# Patient Record
Sex: FEMALE | Race: OTHER | HISPANIC OR LATINO | URBAN - METROPOLITAN AREA
[De-identification: names, ages, dates, MRNs, and addresses within clinical notes are randomized per-mention and may not be internally consistent; named-entity substitution may affect disease eponyms.]

---

## 2024-05-20 ENCOUNTER — OFFICE (OUTPATIENT)
Dept: URBAN - METROPOLITAN AREA CLINIC 94 | Facility: CLINIC | Age: 74
Setting detail: OPHTHALMOLOGY
End: 2024-05-20
Payer: MEDICARE

## 2024-05-20 DIAGNOSIS — H35.3131: ICD-10-CM

## 2024-05-20 PROBLEM — E11.3293 DM TYPE 2; BOTH MILD WITHOUT ME: Status: ACTIVE | Noted: 2024-05-20

## 2024-05-20 PROBLEM — H43.392 VITREOUS FLOATERS/OPACITIES; RIGHT EYE, LEFT EYE: Status: ACTIVE | Noted: 2024-05-20

## 2024-05-20 PROBLEM — H43.391 VITREOUS FLOATERS/OPACITIES; RIGHT EYE, LEFT EYE: Status: ACTIVE | Noted: 2024-05-20

## 2024-05-20 PROCEDURE — 92004 COMPRE OPH EXAM NEW PT 1/>: CPT | Performed by: SPECIALIST

## 2024-05-20 PROCEDURE — 92235 FLUORESCEIN ANGRPH MLTIFRAME: CPT | Performed by: SPECIALIST

## 2024-05-20 PROCEDURE — 92134 CPTRZ OPH DX IMG PST SGM RTA: CPT | Performed by: SPECIALIST

## 2024-05-20 ASSESSMENT — LID EXAM ASSESSMENTS: OS_COMMENTS: 10, PROMINENT FOREHEAD RHYTIDS

## 2024-05-20 ASSESSMENT — CONFRONTATIONAL VISUAL FIELD TEST (CVF)
OS_FINDINGS: FULL
OD_FINDINGS: FULL

## 2024-05-20 ASSESSMENT — LID POSITION - PTOSIS
OD_PTOSIS: RUL
OS_PTOSIS: LUL

## 2024-05-30 ENCOUNTER — APPOINTMENT (OUTPATIENT)
Dept: ORTHOPEDIC SURGERY | Facility: CLINIC | Age: 74
End: 2024-05-30

## 2024-05-30 VITALS
DIASTOLIC BLOOD PRESSURE: 80 MMHG | HEIGHT: 57 IN | SYSTOLIC BLOOD PRESSURE: 150 MMHG | BODY MASS INDEX: 31.93 KG/M2 | HEART RATE: 76 BPM | WEIGHT: 148 LBS

## 2024-05-30 DIAGNOSIS — M79.643 PAIN IN UNSPECIFIED HAND: ICD-10-CM

## 2024-05-30 DIAGNOSIS — M65.30 TRIGGER FINGER, UNSPECIFIED FINGER: ICD-10-CM

## 2024-05-30 DIAGNOSIS — M19.049 PRIMARY OSTEOARTHRITIS, UNSPECIFIED HAND: ICD-10-CM

## 2024-05-30 DIAGNOSIS — M79.646 PAIN IN UNSPECIFIED HAND: ICD-10-CM

## 2024-05-30 DIAGNOSIS — M72.0 PALMAR FASCIAL FIBROMATOSIS [DUPUYTREN]: ICD-10-CM

## 2024-05-30 PROBLEM — Z00.00 ENCOUNTER FOR PREVENTIVE HEALTH EXAMINATION: Status: ACTIVE | Noted: 2024-05-30

## 2024-05-30 NOTE — ASSESSMENT
[FreeTextEntry1] : ASSESSMENT: [The patient was accompanied today and was assisted with explaining their complaints today.] The patient comes in today with multiple chronic exacerbated complaints and conditions which consist of chronic Dupuytren's contracture.  We have discussed treatment modalities for this.  With this in mind she elects to proceed with surgical management however would like to proceed after her trip in July. Furthermore, we have discussed her tendinopathy and small joint arthropathy and stiffness with this in mind she would like to proceed with injections   The patient was adequately and thoroughly informed of my assessment of their current condition(s).  - This may diminish bodily function for the extremity. We discussed prognosis, tx modalities including operative and nonoperative options for the above diagnostic assessment. As always, 2nd opinion is always provided as an option.  When accessible, I was able to review other physicians note(s) including reviewing other tests, imaging results as well as personally view these results for my own interpretation.   Injection:   The risks and benefits of a steroid injection were discussed in detail. The risks include but are not limited to: pain, infection, swelling, flare response, bleeding, subcutaneous fat atrophy, skin depigmentation and/or elevation of blood sugar. The risk of incomplete resolution of symptoms, recurrence and additional intervention was reviewed and considered by the patient. The patient agreed to proceed and under a sterile prep, I injected 1 unit 6mg into 1 cc of a combination of Celestone and Lidocaine into the left index A1, left index PIP joint. The patient tolerated the injection well. The patient was adequately and thoroughly informed of my assessment of their current condition(s).  DISCUSSION: 1.  Injections as above.  Activity modification. 2.  Follow-up in August after her trip to [facility].  Would like to proceed with Dupuytren's release 3. [x]

## 2024-05-30 NOTE — HISTORY OF PRESENT ILLNESS
[FreeTextEntry1] : Ibeth is a pleasant 74-year-old female who presents today with a chronic history of left hand contracture furthermore she complains of swelling and pain and episodes of triggering separately.  It is inhibiting ADLs.

## 2024-05-30 NOTE — PHYSICAL EXAM
[de-identified] : Examination of the left hand reveals a palpable pretendinous cord down to the level of the small MCP with a 50 degree contracture and 10 degree contracture of PIP joint.  There is a 10 degree contracture of the adjacent ring MCP joint.  Positive tabletop test.  Noncorrectable  Examination of the hand(s)  particularly at the A1 of the left index reveals tenderness with a palpable click. Examination of the left index also reveals tenderness and bogginess at the PIP joint.  No signs of infection.  There is mild stiffness [de-identified] : [4] views of [bilateral hands and wrists] were obtained today in my office and were seen by me and discussed with the patient.  These [show findings consistent with bilateral basal joint OA and findings of IP joint OA]

## 2024-06-18 ENCOUNTER — OFFICE (OUTPATIENT)
Dept: URBAN - METROPOLITAN AREA CLINIC 94 | Facility: CLINIC | Age: 74
Setting detail: OPHTHALMOLOGY
End: 2024-06-18
Payer: MEDICARE

## 2024-06-18 DIAGNOSIS — H02.423: ICD-10-CM

## 2024-06-18 DIAGNOSIS — G24.5: ICD-10-CM

## 2024-06-18 DIAGNOSIS — H04.121: ICD-10-CM

## 2024-06-18 DIAGNOSIS — H04.122: ICD-10-CM

## 2024-06-18 DIAGNOSIS — H04.123: ICD-10-CM

## 2024-06-18 PROBLEM — H43.392 VITREOUS FLOATERS/OPACITIES; RIGHT EYE, LEFT EYE: Status: ACTIVE | Noted: 2024-06-18

## 2024-06-18 PROBLEM — H43.391 VITREOUS FLOATERS/OPACITIES; RIGHT EYE, LEFT EYE: Status: ACTIVE | Noted: 2024-06-18

## 2024-06-18 PROCEDURE — 83861 MICROFLUID ANALY TEARS: CPT | Mod: QW,RT | Performed by: OPHTHALMOLOGY

## 2024-06-18 PROCEDURE — 92012 INTRM OPH EXAM EST PATIENT: CPT | Performed by: OPHTHALMOLOGY

## 2024-06-18 PROCEDURE — 83861 MICROFLUID ANALY TEARS: CPT | Mod: QW,LT | Performed by: OPHTHALMOLOGY

## 2024-06-18 ASSESSMENT — LID EXAM ASSESSMENTS: OS_COMMENTS: 10, PROMINENT FOREHEAD RHYTIDS

## 2024-06-18 ASSESSMENT — LID POSITION - PTOSIS
OD_PTOSIS: RUL
OS_PTOSIS: LUL

## 2024-06-18 ASSESSMENT — CONFRONTATIONAL VISUAL FIELD TEST (CVF)
OD_FINDINGS: FULL
OS_FINDINGS: FULL

## 2024-09-12 ENCOUNTER — APPOINTMENT (OUTPATIENT)
Dept: ORTHOPEDIC SURGERY | Facility: CLINIC | Age: 74
End: 2024-09-12
Payer: MEDICARE

## 2024-09-12 VITALS
HEART RATE: 96 BPM | WEIGHT: 148 LBS | DIASTOLIC BLOOD PRESSURE: 79 MMHG | BODY MASS INDEX: 31.93 KG/M2 | SYSTOLIC BLOOD PRESSURE: 125 MMHG | HEIGHT: 57 IN

## 2024-09-12 DIAGNOSIS — M72.0 PALMAR FASCIAL FIBROMATOSIS [DUPUYTREN]: ICD-10-CM

## 2024-09-12 DIAGNOSIS — M19.049 PRIMARY OSTEOARTHRITIS, UNSPECIFIED HAND: ICD-10-CM

## 2024-09-12 DIAGNOSIS — M65.30 TRIGGER FINGER, UNSPECIFIED FINGER: ICD-10-CM

## 2024-09-12 NOTE — ASSESSMENT
[FreeTextEntry1] : ASSESSMENT: [The patient was accompanied today and was assisted with explaining their complaints today.] The patient comes in today with multiple chronic exacerbated complaints and conditions which consist of chronic Dupuytren's contracture.  We have discussed treatment modalities for this.  At this point in time she would like to proceed with surgical management for the condition.  We have discussed risk of recurrence. Furthermore, we have discussed her tendinopathy and small joint arthropathy and stiffness with this in mind she would like to proceed with injections  Dupuytren's contracture release surgery   At this point the patient comes in with significant contracture of their hand and fingers.  We discussed complete fasciectomy as well as partial fasciectomy.  The patient's goal is to achieve as much extension as possible.  We discussed joint stiffness and the likelihood that the patient will need joint releases at the time of surgery with subsequent need for physical therapy and or joint pinning in the acute setting.  We discussed complications including numbness, vascular injury, vascular stretching due to chronic scarring.  We discussed possibility of need for amputation if the finger does not pink up.   We discussed the possibility of this condition returning and/or affecting other digits as a flareup.  We discussed the need for therapy to optimize his outcome.  This is just as important as the surgery itself.  They verbalized complete understanding and willingness to proceed.   Surgical Consent Discussion:   I explained the risks and benefits of surgical intervention to the patient. The risks include, but are not limited to: pain, infection, swelling, stiffness, injury to underlying neurovascular structures, scar sensitivity, incomplete resolution of symptoms, the possibility of the need for future surgery, CRPS, and finally the need to comply with a post-operative occupational therapy protocol. The patient understands, agrees and informed consent was obtained. The patients surgery will be scheduled in the near future.   The patient was adequately and thoroughly informed of my assessment of their current condition(s).  - This may diminish bodily function for the extremity. We discussed prognosis, tx modalities including operative and nonoperative options for the above diagnostic assessment. As always, 2nd opinion is always provided as an option.  When accessible, I was able to review other physicians note(s) including reviewing other tests, imaging results as well as personally view these results for my own interpretation.   The patient was adequately and thoroughly informed of my assessment of their current condition(s).  DISCUSSION: 1.  She elects to proceed with left small finger palmar fasciectomy with release of interphalangeal joint and left index trigger release

## 2024-09-12 NOTE — PHYSICAL EXAM
[de-identified] : Examination of the left hand reveals a palpable pretendinous cord down to the level of the small MCP with a 50 degree contracture and 10 degree contracture of PIP joint.  There is a 10 degree contracture of the adjacent ring MCP joint.  Positive tabletop test.  Noncorrectable  Examination of the hand(s)  particularly at the A1 of the left index reveals tenderness with a palpable click. Examination of the left index also reveals tenderness and bogginess at the PIP joint.  No signs of infection.  There is mild stiffness [de-identified] : [4] views of [bilateral hands and wrists] were reviewed today in my office and were seen by me and discussed with the patient.  These [show findings consistent with bilateral basal joint OA and findings of IP joint OA]

## 2024-09-12 NOTE — HISTORY OF PRESENT ILLNESS
[FreeTextEntry1] : Ibeth is a pleasant 74-year-old female who presents today with a chronic history of left hand contracture as well as swelling and pain and episodes of triggering separately.  It is inhibiting ADLs.

## 2024-09-20 RX ORDER — MELOXICAM 15 MG/1
15 TABLET ORAL DAILY
Qty: 14 | Refills: 0 | Status: ACTIVE | COMMUNITY
Start: 2024-09-20 | End: 1900-01-01

## 2024-09-23 ENCOUNTER — APPOINTMENT (OUTPATIENT)
Dept: ORTHOPEDIC SURGERY | Facility: AMBULATORY SURGERY CENTER | Age: 74
End: 2024-09-23

## 2024-10-02 ENCOUNTER — APPOINTMENT (OUTPATIENT)
Dept: ORTHOPEDIC SURGERY | Facility: CLINIC | Age: 74
End: 2024-10-02
Payer: MEDICARE

## 2024-10-02 DIAGNOSIS — M65.30 TRIGGER FINGER, UNSPECIFIED FINGER: ICD-10-CM

## 2024-10-02 DIAGNOSIS — M72.0 PALMAR FASCIAL FIBROMATOSIS [DUPUYTREN]: ICD-10-CM

## 2024-10-02 DIAGNOSIS — M79.646 PAIN IN UNSPECIFIED HAND: ICD-10-CM

## 2024-10-02 DIAGNOSIS — M79.643 PAIN IN UNSPECIFIED HAND: ICD-10-CM

## 2024-10-02 NOTE — HISTORY OF PRESENT ILLNESS
[de-identified] : S/P: Left hand palmar fasciectomy with release of small finger including interphalangeal joint. Left index trigger finger release. DOS: 9/23/24 [de-identified] : Returns for follow-up. [de-identified] : Incisions are healing beautifully.  There are no signs of infection. Left small finger passively correctable to neutral. There is some stiffness when attempting to make a fist as expected at this stage in the postoperative period. [de-identified] : We are both delighted with the results. She will commence OT to further optimize her outcome.  [de-identified] : 1. Commence OT. Follow up in 6 weeks.

## 2024-11-12 ENCOUNTER — APPOINTMENT (OUTPATIENT)
Dept: ORTHOPEDIC SURGERY | Facility: CLINIC | Age: 74
End: 2024-11-12
Payer: MEDICARE

## 2024-11-12 DIAGNOSIS — M65.30 TRIGGER FINGER, UNSPECIFIED FINGER: ICD-10-CM

## 2024-11-12 DIAGNOSIS — M79.643 PAIN IN UNSPECIFIED HAND: ICD-10-CM

## 2024-11-12 DIAGNOSIS — G56.00 CARPAL TUNNEL SYNDROME, UNSPECIFIED UPPER LIMB: ICD-10-CM

## 2024-11-12 DIAGNOSIS — M79.646 PAIN IN UNSPECIFIED HAND: ICD-10-CM

## 2024-11-12 DIAGNOSIS — M19.049 PRIMARY OSTEOARTHRITIS, UNSPECIFIED HAND: ICD-10-CM

## 2024-12-25 PROBLEM — F10.90 ALCOHOL USE: Status: INACTIVE | Noted: 2019-07-19

## 2025-03-25 ENCOUNTER — APPOINTMENT (OUTPATIENT)
Dept: ORTHOPEDIC SURGERY | Facility: CLINIC | Age: 75
End: 2025-03-25
Payer: MEDICARE

## 2025-03-25 DIAGNOSIS — M65.322 TRIGGER FINGER, LEFT INDEX FINGER: ICD-10-CM

## 2025-03-25 DIAGNOSIS — G56.02 CARPAL TUNNEL SYNDROME, LEFT UPPER LIMB: ICD-10-CM

## 2025-03-25 DIAGNOSIS — M65.342 TRIGGER FINGER, LEFT RING FINGER: ICD-10-CM

## 2025-04-25 ENCOUNTER — APPOINTMENT (OUTPATIENT)
Dept: CARDIOLOGY | Facility: CLINIC | Age: 75
End: 2025-04-25
Payer: MEDICARE

## 2025-04-25 ENCOUNTER — NON-APPOINTMENT (OUTPATIENT)
Age: 75
End: 2025-04-25

## 2025-04-25 VITALS
RESPIRATION RATE: 16 BRPM | HEART RATE: 95 BPM | DIASTOLIC BLOOD PRESSURE: 80 MMHG | HEIGHT: 60 IN | WEIGHT: 153 LBS | SYSTOLIC BLOOD PRESSURE: 139 MMHG | BODY MASS INDEX: 30.04 KG/M2

## 2025-04-25 DIAGNOSIS — R07.89 OTHER CHEST PAIN: ICD-10-CM

## 2025-04-25 DIAGNOSIS — Z13.6 ENCOUNTER FOR SCREENING FOR CARDIOVASCULAR DISORDERS: ICD-10-CM

## 2025-04-25 DIAGNOSIS — E66.9 OBESITY, UNSPECIFIED: ICD-10-CM

## 2025-04-25 DIAGNOSIS — E88.810 METABOLIC SYNDROME: ICD-10-CM

## 2025-04-28 ENCOUNTER — APPOINTMENT (OUTPATIENT)
Dept: ORTHOPEDIC SURGERY | Facility: CLINIC | Age: 75
End: 2025-04-28
Payer: MEDICARE

## 2025-04-28 DIAGNOSIS — M25.642 STIFFNESS OF LEFT HAND, NOT ELSEWHERE CLASSIFIED: ICD-10-CM

## 2025-04-28 DIAGNOSIS — M65.342 TRIGGER FINGER, LEFT RING FINGER: ICD-10-CM

## 2025-04-28 DIAGNOSIS — G56.02 CARPAL TUNNEL SYNDROME, LEFT UPPER LIMB: ICD-10-CM

## 2025-04-28 DIAGNOSIS — M65.322 TRIGGER FINGER, LEFT INDEX FINGER: ICD-10-CM

## 2025-04-28 PROCEDURE — 20550 NJX 1 TENDON SHEATH/LIGAMENT: CPT | Mod: 59,LT

## 2025-04-28 PROCEDURE — 99204 OFFICE O/P NEW MOD 45 MIN: CPT | Mod: 25

## 2025-04-28 PROCEDURE — 20526 THER INJECTION CARP TUNNEL: CPT | Mod: LT

## 2025-05-01 ENCOUNTER — EMERGENCY (EMERGENCY)
Facility: HOSPITAL | Age: 75
LOS: 1 days | End: 2025-05-01
Attending: EMERGENCY MEDICINE
Payer: MEDICARE

## 2025-05-01 VITALS
OXYGEN SATURATION: 99 % | HEART RATE: 109 BPM | WEIGHT: 152.12 LBS | SYSTOLIC BLOOD PRESSURE: 152 MMHG | RESPIRATION RATE: 18 BRPM | TEMPERATURE: 98 F | DIASTOLIC BLOOD PRESSURE: 82 MMHG

## 2025-05-01 VITALS
OXYGEN SATURATION: 100 % | HEART RATE: 66 BPM | RESPIRATION RATE: 20 BRPM | TEMPERATURE: 98 F | DIASTOLIC BLOOD PRESSURE: 87 MMHG | SYSTOLIC BLOOD PRESSURE: 171 MMHG

## 2025-05-01 DIAGNOSIS — Z90.49 ACQUIRED ABSENCE OF OTHER SPECIFIED PARTS OF DIGESTIVE TRACT: Chronic | ICD-10-CM

## 2025-05-01 LAB
ALBUMIN SERPL ELPH-MCNC: 4 G/DL — SIGNIFICANT CHANGE UP (ref 3.3–5.2)
ALP SERPL-CCNC: 90 U/L — SIGNIFICANT CHANGE UP (ref 40–120)
ALT FLD-CCNC: 24 U/L — SIGNIFICANT CHANGE UP
ANION GAP SERPL CALC-SCNC: 17 MMOL/L — SIGNIFICANT CHANGE UP (ref 5–17)
APTT BLD: 28.6 SEC — SIGNIFICANT CHANGE UP (ref 26.1–36.8)
AST SERPL-CCNC: 35 U/L — HIGH
BASOPHILS # BLD AUTO: 0.05 K/UL — SIGNIFICANT CHANGE UP (ref 0–0.2)
BASOPHILS NFR BLD AUTO: 0.5 % — SIGNIFICANT CHANGE UP (ref 0–2)
BILIRUB SERPL-MCNC: 0.4 MG/DL — SIGNIFICANT CHANGE UP (ref 0.4–2)
BUN SERPL-MCNC: 30 MG/DL — HIGH (ref 8–20)
CALCIUM SERPL-MCNC: 9 MG/DL — SIGNIFICANT CHANGE UP (ref 8.4–10.5)
CHLORIDE SERPL-SCNC: 97 MMOL/L — SIGNIFICANT CHANGE UP (ref 96–108)
CO2 SERPL-SCNC: 21 MMOL/L — LOW (ref 22–29)
CREAT SERPL-MCNC: 1.19 MG/DL — SIGNIFICANT CHANGE UP (ref 0.5–1.3)
D DIMER BLD IA.RAPID-MCNC: <150 NG/ML DDU — SIGNIFICANT CHANGE UP
EGFR: 48 ML/MIN/1.73M2 — LOW
EGFR: 48 ML/MIN/1.73M2 — LOW
EOSINOPHIL # BLD AUTO: 0.05 K/UL — SIGNIFICANT CHANGE UP (ref 0–0.5)
EOSINOPHIL NFR BLD AUTO: 0.5 % — SIGNIFICANT CHANGE UP (ref 0–6)
GLUCOSE SERPL-MCNC: 122 MG/DL — HIGH (ref 70–99)
HCT VFR BLD CALC: 40.8 % — SIGNIFICANT CHANGE UP (ref 34.5–45)
HGB BLD-MCNC: 13.6 G/DL — SIGNIFICANT CHANGE UP (ref 11.5–15.5)
IMM GRANULOCYTES # BLD AUTO: 0.06 K/UL — SIGNIFICANT CHANGE UP (ref 0–0.07)
IMM GRANULOCYTES NFR BLD AUTO: 0.6 % — SIGNIFICANT CHANGE UP (ref 0–0.9)
INR BLD: 0.85 RATIO — SIGNIFICANT CHANGE UP (ref 0.85–1.16)
LIDOCAIN IGE QN: 70 U/L — HIGH (ref 22–51)
LYMPHOCYTES # BLD AUTO: 4.03 K/UL — HIGH (ref 1–3.3)
LYMPHOCYTES NFR BLD AUTO: 37.2 % — SIGNIFICANT CHANGE UP (ref 13–44)
MAGNESIUM SERPL-MCNC: 2.1 MG/DL — SIGNIFICANT CHANGE UP (ref 1.6–2.6)
MCHC RBC-ENTMCNC: 29.9 PG — SIGNIFICANT CHANGE UP (ref 27–34)
MCHC RBC-ENTMCNC: 33.3 G/DL — SIGNIFICANT CHANGE UP (ref 32–36)
MCV RBC AUTO: 89.7 FL — SIGNIFICANT CHANGE UP (ref 80–100)
MONOCYTES # BLD AUTO: 0.9 K/UL — SIGNIFICANT CHANGE UP (ref 0–0.9)
MONOCYTES NFR BLD AUTO: 8.3 % — SIGNIFICANT CHANGE UP (ref 2–14)
NEUTROPHILS # BLD AUTO: 5.74 K/UL — SIGNIFICANT CHANGE UP (ref 1.8–7.4)
NEUTROPHILS NFR BLD AUTO: 52.9 % — SIGNIFICANT CHANGE UP (ref 43–77)
NRBC # BLD AUTO: 0 K/UL — SIGNIFICANT CHANGE UP (ref 0–0)
NRBC # FLD: 0 K/UL — SIGNIFICANT CHANGE UP (ref 0–0)
NRBC BLD AUTO-RTO: 0 /100 WBCS — SIGNIFICANT CHANGE UP (ref 0–0)
NT-PROBNP SERPL-SCNC: 96 PG/ML — SIGNIFICANT CHANGE UP (ref 0–300)
PLATELET # BLD AUTO: 338 K/UL — SIGNIFICANT CHANGE UP (ref 150–400)
PMV BLD: 10.7 FL — SIGNIFICANT CHANGE UP (ref 7–13)
POTASSIUM SERPL-MCNC: 4.3 MMOL/L — SIGNIFICANT CHANGE UP (ref 3.5–5.3)
POTASSIUM SERPL-SCNC: 4.3 MMOL/L — SIGNIFICANT CHANGE UP (ref 3.5–5.3)
PROT SERPL-MCNC: 7.6 G/DL — SIGNIFICANT CHANGE UP (ref 6.6–8.7)
PROTHROM AB SERPL-ACNC: 9.9 SEC — SIGNIFICANT CHANGE UP (ref 9.9–13.4)
RAPID RVP RESULT: SIGNIFICANT CHANGE UP
RBC # BLD: 4.55 M/UL — SIGNIFICANT CHANGE UP (ref 3.8–5.2)
RBC # FLD: 13.3 % — SIGNIFICANT CHANGE UP (ref 10.3–14.5)
SARS-COV-2 RNA SPEC QL NAA+PROBE: SIGNIFICANT CHANGE UP
SODIUM SERPL-SCNC: 135 MMOL/L — SIGNIFICANT CHANGE UP (ref 135–145)
TROPONIN T, HIGH SENSITIVITY RESULT: 8 NG/L — SIGNIFICANT CHANGE UP (ref 0–51)
WBC # BLD: 10.83 K/UL — HIGH (ref 3.8–10.5)
WBC # FLD AUTO: 10.83 K/UL — HIGH (ref 3.8–10.5)

## 2025-05-01 PROCEDURE — 99285 EMERGENCY DEPT VISIT HI MDM: CPT

## 2025-05-01 PROCEDURE — 93010 ELECTROCARDIOGRAM REPORT: CPT

## 2025-05-01 PROCEDURE — 71046 X-RAY EXAM CHEST 2 VIEWS: CPT | Mod: 26

## 2025-05-01 RX ORDER — DEXAMETHASONE 0.5 MG/1
10 TABLET ORAL ONCE
Refills: 0 | Status: COMPLETED | OUTPATIENT
Start: 2025-05-01 | End: 2025-05-01

## 2025-05-01 RX ORDER — ALBUTEROL SULFATE 2.5 MG/3ML
2.5 VIAL, NEBULIZER (ML) INHALATION ONCE
Refills: 0 | Status: COMPLETED | OUTPATIENT
Start: 2025-05-01 | End: 2025-05-01

## 2025-05-01 RX ORDER — SODIUM CHLORIDE 9 G/1000ML
500 INJECTION, SOLUTION INTRAVENOUS
Refills: 0 | Status: ACTIVE | OUTPATIENT
Start: 2025-05-01 | End: 2025-05-01

## 2025-05-01 RX ORDER — METOCLOPRAMIDE HCL 10 MG
10 TABLET ORAL ONCE
Refills: 0 | Status: ACTIVE | OUTPATIENT
Start: 2025-05-01 | End: 2025-05-01

## 2025-05-01 RX ADMIN — SODIUM CHLORIDE 500 MILLILITER(S): 9 INJECTION, SOLUTION INTRAVENOUS at 20:44

## 2025-05-01 RX ADMIN — DEXAMETHASONE 102 MILLIGRAM(S): 0.5 TABLET ORAL at 20:39

## 2025-05-01 RX ADMIN — Medication 2.5 MILLIGRAM(S): at 20:39

## 2025-05-01 NOTE — ED PROVIDER NOTE - OBJECTIVE STATEMENT
A 73 yo F with HTN, DM2, HLD, presents to the ED for 2 weeks of cough, associated continuous L upper chest pain radiating to throat, which worsened last night. Using Insuline. Ozempic for 3 years. Denies fevers, chills, headache, SOB, nausea, vomiting, diarrhea, dysuria, dark stools, focal neurologic symptoms. Denies recent immobilization or surgery. Denies hx of CAD.

## 2025-05-01 NOTE — ED PROVIDER NOTE - NSFOLLOWUPINSTRUCTIONS_ED_ALL_ED_FT
1) Follow up with your doctor in 1-2 days  2) Return to the ER for worsening or concerning symptoms    1) Acuda a jarrell jayme de seguimiento con koehler médico en 1 o 2 días.  2) Regrese a urgencias si presenta síntomas preocupantes o empeoramiento.        Paciente: JESSICA KUMAR  Profesional que asiste al paciente: Estinfa, [employer]  Enfermedad de reflujo gastroesofágico en los adultos  Gastroesophageal Reflux Disease, Adult       El reflujo gastroesofágico (RGE) ocurre cuando el ácido del estómago sube por el tubo que conecta la boca con el estómago (esófago). Normalmente, la comida baja por el esófago y se mantiene en el estómago, donde se la digiere. Sin embargo, cuando jarrell persona tiene ERGE, los alimentos y el ácido estomacal suelen volver al esófago. Si esto se vuelve un problema más grave, a la persona se le puede diagnosticar jarrell enfermedad llamada enfermedad de reflujo gastroesofágico (ERGE). La ERGE ocurre cuando el reflujo:    Sucede a menudo.   Causa síntomas frecuentes o graves.   Causa problemas tales candice daño en el esófago.    Cuando el ácido del estómago entra en contacto con el esófago, el ácido puede provocar dolor (inflamación) en el esófago. Con el tiempo, pueden formarse pequeños agujeros (úlceras) en el revestimiento del esófago.    ¿Cuáles son las causas?  Esta afección se debe a un problema en el músculo que se encuentra entre el esófago y el estómago (esfínter esofágico inferior, o EEI). Normalmente, el EEI se myriam jarrell vez que la comida pasa a través del esófago hasta el estómago. Cuando el EEI se encuentra debilitado o tiene alguna anomalía, no se myriam por completo, y eso permite que tanto la comida candice el jugo gástrico, que es ácido, vuelvan a subir por el esófago.    El EEI puede debilitarse a causa de ciertas sustancias alimenticias, medicamentos y afecciones, que incluyen:    El consumo de tabaco.  Embarazo.  Tener jarrell hernia de hiato.  Consumo de alcohol.  Ciertos alimentos y bebidas, candice café, chocolate, cebollas y menta.    ¿Qué incrementa el riesgo?  Es más probable que tenga esta afección si:    Tiene un aumento del peso corporal.  Tiene un trastorno del tejido conjuntivo.  Usa antiinflamatorios no esteroideos (JOSE A).    ¿Cuáles son los signos o los síntomas?  Los síntomas de esta afección incluyen:    Acidez estomacal.  Dificultad o dolor al tragar.  Sensación de tener un bulto en la garganta.  Sabor amargo en la boca.  Mal aliento.  Gran cantidad de saliva.  Estómago inflamado o con malestar.  Eructos.  Dolor en el pecho. El dolor de pecho puede deberse a distintas afecciones. Es importante que consulte al médico si tiene dolor de pecho.  Dificultad para respirar o sibilancias.  Tos kathleen (crónica) o tos nocturna.  Desgaste del esmalte dental.  Pérdida de peso.    ¿Cómo se diagnostica?  El médico le hará jarrell historia clínica y un examen físico. Para determinar si tiene ERGE leve o grave, el médico también puede controlar cómo usted reacciona al tratamiento. También pueden hacerle estudios, que incluyen los siguientes:    Un estudio para examinarle el estómago y el esófago con jarrell cámara pequeña (endoscopía).  Jarrell prueba para medir el abhijit de acidez en el esófago.  Jarrell prueba para medir cuánta presión hay en el esófago.  Un estudio de deglución con bario común o modificado para mg la forma, el tamaño y el funcionamiento del esófago.    ¿Cómo se trata?  El objetivo del tratamiento es ayudar a aliviar los síntomas y evitar las complicaciones. El tratamiento de esta afección puede variar según la gravedad de los síntomas. El médico puede recomendarle lo siguiente:    Cambios en la dieta.  Medicamentos.  Jarrell cirugía.    Siga estas indicaciones en koehler casa:      Comida y bebida     Siga la dieta recomendada por el médico. Darien puede incluir evitar ciertos alimentos y bebidas, por ejemplo:    Café y té (con o sin cafeína).  Bebidas que contengan alcohol.  Bebidas energéticas y deportivas.  Bebidas gaseosas o refrescos.  Chocolate y cacao.  Menta y esencia de menta.  Palmyra y cebolla.  Rábano picante.  Alimentos condimentados, picantes y ácidos, por ejemplo, todos los tipos de pimientas, chile en polvo, grissom en polvo, vinagre, salsas picantes y salsa barbacoa.  Cítricos y bita jugos, por ejemplo, naranjas, linda y gurea.  Alimentos a base de tomate, candice salsa de tomate, chile, salsa picante y pizza con salsa de tomate.  Alimentos fritos y grasos, candice donas, jolly fritas y aderezos ricos en grasas.  Justyna con alto contenido de grasa, candice salchichas, y mckenna de justyna giles y maylin con mucha grasa, por ejemplo, chuletas o costillas, embutidos, jamón y tocino.  Productos lácteos ricos en grasas, candice leche entera, manteca y queso crema.  Phill comidas pequeñas y frecuentes meghan el día en lugar de comidas abundantes.  Evite beber grandes cantidades de líquidos con las comidas.  Evite comer 2 o 3 horas antes de acostarse.  Evite recostarse inmediatamente después de comer.  No phill ejercicios enseguida después de comer.        Estilo de toni     No consuma ningún producto que contenga nicotina o tabaco, candice cigarrillos, cigarrillos electrónicos y tabaco de mascar. Si necesita ayuda para dejar de fumar, consulte al médico.  Trate de reducir el estrés con métodos candice el yoga o la meditación. Si necesita ayuda para reducir el nivel de estrés, consulte al médico.  Si tiene sobrepeso, baje hasta llegar a un peso saludable para usted. Pídale consejos al médico para bajar de peso de manera lennon.        Indicaciones generales    Esté atento a cualquier cambio en los síntomas.  Medanales los medicamentos de venta wyatt y los recetados solamente candice se lo haya indicado el médico. No tome aspirina, ibuprofeno ni otros JOSE A a menos que el médico se lo indique.  Use ropa holgada. No use nada apretado alrededor de la cintura que phill presión sobre el abdomen.  Levante (eleve) la cabecera de la cama aproximadamente 6 pulgadas (15 cm).  Evite inclinarse si al hacerlo empeoran los síntomas.  Concurra a todas las visitas de seguimiento candice se lo haya indicado el médico. Darien es importante.    Comuníquese con un médico si:  Tiene los siguientes síntomas:    Síntomas nuevos.  Pérdida de peso sin causa aparente.  Dificultad o dolor al tragar.  Sibilancias o jarrell tos persistente.  Voz ronca.  Los síntomas no mejoran con el tratamiento.    Solicite ayuda inmediatamente si:  Siente dolor en los brazos, el jose maria, la mandíbula, los dientes o la espalda.  Se siente transpirado, mareado o tiene jarrell sensación de desvanecimiento.  Siente dolor intenso en el pecho o le falta el aire.  Vomita y el vómito tiene un aspecto similar a la tatyana o a los posos de café.  Se desmaya.  Tiene heces sanguinolentas o negras.  No puede tragar, beber o comer.    Resumen  El reflujo gastroesofágico ocurre cuando el ácido del estómago sube al esófago. La ERGE es jarrell enfermedad en la que el reflujo ocurre con frecuencia, causa síntomas frecuentes o graves, o causa problemas tales candice daño en el esófago.  El tratamiento de esta afección puede variar según la gravedad de los síntomas. El médico puede indicarle que siga jarrell dieta y phill cambios en koehler estilo de toni, tome medicamentos o se someta a jarrell cirugía.  Comuníquese con un médico si tiene síntomas nuevos o los síntomas empeoran.  Medanales los medicamentos de venta wyatt y los recetados solamente candice se lo haya indicado el médico. No tome aspirina, ibuprofeno ni otros JOSE A a menos que el médico se lo indique.  Concurra a todas las visitas de seguimiento candice se lo haya indicado el médico. Darien es importante.    NOTAS ADICIONALES E INSTRUCCIONES    Please follow up with your Primary MD in 24-48 hr.  Seek immediate medical care for any new/worsening signs or symptoms.           Dolor de pecho inespecífico en los adultos  Nonspecific Chest Pain, Adult    El dolor de pecho puede deberse a muchas afecciones diferentes. Puede ser provocado por jarrell afección que es potencialmente mortal y requiere tratamiento hospitalario de inmediato. También puede ser provocado por algo que no es potencialmente mortal. Si tiene dolor de pecho, puede ser difícil saber la diferencia, por lo tanto es importante que obtenga ayuda de inmediato para asegurarse de que no tiene jarrell afección grave.    Algunas causas potencialmente mortales del dolor de pecho incluyen:    Infarto de miocardio.  Un desgarro en el vaso sanguíneo principal del cuerpo (disección aórtica).  Inflamación alrededor del corazón (pericarditis).  Un problema en los pulmones, candice un coágulo de tatyana (embolia pulmonar) o un pulmón colapsado (neumotórax).    Algunas causas de dolor de pecho que no son potencialmente mortales incluyen:    Acidez estomacal.  Ansiedad o estrés.  Daño de los huesos, los músculos y los cartílagos que conforman la pared torácica.  Neumonía o bronquitis.  Culebrilla (virus de la varicela zóster).    El dolor de pecho puede provocar las siguientes sensaciones:    Dolor o molestias en la superficie o en lo profundo del pecho.  Dolor opresivo, continuo o constrictivo.  Ardor u hormigueo.  Dolor sordo o intenso que empeora al moverse, toser o inhalar profundamente.  Dolor o molestias que también se sienten en la espalda, el jose maria, la mandíbula, el hombro o el brazo, o dolor que se extiende a cualquiera de estas zonas.    El dolor de pecho puede aparecer y desaparecer. También puede ser kathleen. Koehler médico le hará análisis clínicos y otros estudios para tratar de determinar la causa del dolor. El tratamiento dependerá de la causa del dolor de pecho.    Siga estas indicaciones en koehler casa:      Medicamentos    Medanales los medicamentos de venta wyatt y los recetados solamente candice se lo haya indicado el médico.  Si le recetaron un antibiótico, tómelo o úselo candice se lo haya indicado el médico. No deje de sky los antibióticos aunque comience a sentirse mejor.        Estilo de toni     Phill reposo candice se lo haya indicado el médico.  No consuma ningún producto que contenga nicotina o tabaco, candice cigarrillos y cigarrillos electrónicos. Si necesita ayuda para dejar de fumar, consulte al médico.  No ayush alcohol.  Opte por un estilo de toni saludable según lo recomendado. Darien puede incluir lo siguiente:    Practicar actividad física con regularidad. Pida al médico que le sugiera algunas actividades que moy seguras para usted.  Seguir jarrell dieta cardiosaludable. Esta debe incluir muchas frutas y verduras frescas, cereales integrales, proteínas (magras) con bajo contenido de grasa y productos lácteos bajos en grasa. Un nutricionista podrá ayudarlo a hacer elecciones de alimentación saludables.  Mantener un peso saludable.  Controlar cualquier otra afección que tenga, candice presión arterial ciara (hipertensión) o diabetes.  Disminuir el nivel de estrés; por ejemplo, con yoga o técnicas de relajación.        Indicaciones generales    Esté atento a cualquier cambio en los síntomas. Informe al médico sobre estos o si tiene síntomas nuevos.  Evite las actividades que le causen dolor de pecho.  Concurra a todas las visitas de seguimiento candice se lo haya indicado el médico. Darien es importante. Darien incluye las visitas para realizarle otros estudios si el dolor de pecho no desaparece.    Comuníquese con un médico si:  El dolor de pecho no desaparece.  Se siente deprimido.  Tiene fiebre.    Solicite ayuda inmediatamente si:  El dolor en el pecho empeora.  Tiene tos que empeora o tose con tatyana.  Siente un dolor intenso en el abdomen.  Se desmaya.  Tiene jarrell molestia repentina e inexplicable en el pecho.  Tiene molestias repentinas e inexplicables en los brazos, la espalda, el jose maria o la mandíbula.  Le falta el aire en cualquier momento.  Comienza a sudar de manera repentina o la piel se le humedece.  Siente náuseas o vomita.  Se siente repentinamente mareado o se desmaya.  Tiene debilidad intensa, o debilidad o fatiga sin explicación.  Siente que el corazón comienza a latir rápidamente o que se saltea latidos.    Estos síntomas pueden representar un problema grave que constituye jarrell emergencia. No espere a mg si los síntomas desaparecen. Solicite atención médica de inmediato. Comuníquese con el servicio de emergencias de koehler localidad (911 en los [place]). No conduzca por bita propios medios hasta el hospital.    Resumen  El dolor de pecho puede ser provocado por jarrell afección que es grave y requiere tratamiento urgente. También puede ser provocado por algo que no es potencialmente mortal.  Si tiene dolor de pecho, es muy importante que consulte con el médico. El médico podrá hacerle análisis clínicos y otros estudios para tratar de determinar la causa del dolor.  Siga las indicaciones de koehler médico sobre sky medicamentos, hacer cambios en koehler estilo de toni y recibir tratamiento de urgencia si los síntomas empeoran.  Concurra a todas las visitas de seguimiento candice se lo haya indicado el médico. [place]arien incluye las visitas para realizarle otros estudios si el dolor de pecho no desaparece.    NOTAS ADICIONALES E INSTRUCCIONES    Please follow up with your Primary MD in 24-48 hr.  Seek immediate medical care for any new/worsening signs or symptoms.     Document Released: 9/27/2006 Document Revised: 6/20/2019 Document Reviewed: 6/20/2019  Elsevier Interactive Patient Education ©2019 [employer] This information is not intended to replace advice given to you by your health care provider. Make sure you discuss any questions you have with your health care provider.

## 2025-05-01 NOTE — ED PROVIDER NOTE - CLINICAL SUMMARY MEDICAL DECISION MAKING FREE TEXT BOX
A 73 yo F with HTN, DM2, HLD, presents to the ED for 2 weeks of cough, associated continuous L upper chest pain radiating to throat, which worsened last night. Using Insuline. Ozempic for 3 years. Denies fevers, chills, headache, SOB, nausea, vomiting, diarrhea, dysuria, dark stools, focal neurologic symptoms. Denies recent immobilization or surgery. Denies hx of CAD.  R/O PNA, bronchitis, PE, CAD. Will check cbc, cmp, coags, trop T, lipase, mg, cxr. ECG nsr. Will check swab. Symptom management and reassess. A 73 yo F with HTN, DM2, HLD, presents to the ED for 2 weeks of cough, associated continuous L upper chest pain radiating to throat, which worsened last night. Using Insuline. Ozempic for 3 years. Denies fevers, chills, headache, SOB, nausea, vomiting, diarrhea, dysuria, dark stools, focal neurologic symptoms. Denies recent immobilization or surgery. Denies hx of CAD.  R/O PNA, bronchitis, PE, CAD. Will check cbc, cmp, coags, trop T, lipase, mg, cxr. ECG nsr. Will check swab. Symptom management and reassess.    Patient received in sign-out from Dr. Good pending troponin in setting of patient with recurrent reflux symptoms with coughing/chest pain.

## 2025-05-01 NOTE — ED PROVIDER NOTE - ATTENDING CONTRIBUTION TO CARE
Florentino [id number]-old female with history of hypertension hyperlipidemia and hypothyroidism currently on Ozempic for weight loss presents with episode of left-sided chest pressure and palpitations with regurg and cough.  Patient has these intermittent recurrent episodes.  Patient never had pain like this before and feels like a chest tightness.  No vomiting no diarrhea.  Patient is non-smoker.  Patient is here with her daughter who is at the bedside    ED  [place]    Patient is alert well-appearing female, S1-S2 normal regular, bilateral clear breath sounds, no focal chest wall tenderness, noted right eye twitching, neuroexam alert oriented x 3 no focal deficits, skin warm dry good turgor    Plan to do EKG which was normal rule out ACS pneumonia chest x-ray showed diffuse bronchitis will treat patient for acute bronchitis and GERD likely from Ozempic.  Will also rule out pancreatitis and reassess if labs are normal plan for outpatient cardiology follow-up

## 2025-05-01 NOTE — ED ADULT TRIAGE NOTE - NS ED TRIAGE AVPU SCALE
History  Chief Complaint   Patient presents with   • Flu Symptoms     Pt c/o fever, chills, productive cough, and sore throat x 1 week. Pt has hx of asthma and states she feels SOB     HPI    Gamal Hunt is a 45 y.o. female medical history asthma on Proventil and Advair presenting with four days of productive cough with green sputum and sore throat with two days of subjective fever. She states that she has had shortness of breath in the evening "rattling" sensation in her chest.  She has been using her rescue inhaler 3 symptoms which has provided some relief. Temperature at time of presentation is 97.6. CXR today negative for pulmonary infiltrate. Prior to Admission Medications   Prescriptions Last Dose Informant Patient Reported? Taking? Advair HFA 45-21 MCG/ACT inhaler   No No   Sig: INHALE 2 PUFFS BY MOUTH 2 TIMES A DAY RINSE MOUTH AFTER USE.   acetaZOLAMIDE (DIAMOX) 250 mg tablet   No No   Sig: TAKE 2 TABLETS BY MOUTH EVERY MORNING   albuterol (2.5 mg/3 mL) 0.083 % nebulizer solution   No No   Sig: Take 3 mL (2.5 mg total) by nebulization every 6 (six) hours as needed for wheezing or shortness of breath   albuterol (PROVENTIL HFA,VENTOLIN HFA) 90 mcg/act inhaler   No No   Sig: INHALE 1 PUFF EVERY 4 HOURS AS NEEDED FOR WHEEZING OR SHORTNESS OF BREATH.    ferrous sulfate 324 (65 Fe) mg   No No   Sig: Take 1 tablet (324 mg total) by mouth every other day   fluticasone-salmeterol (Advair) 500-50 mcg/dose inhaler   Yes No   Sig: Inhale 1 puff 2 (two) times a day   gabapentin (NEURONTIN) 100 mg capsule   No No   Sig: Take 1 capsule (100 mg total) by mouth 3 (three) times a day   indomethacin (INDOCIN) 25 mg capsule   No No   Sig: TAKE 1 TO 2 CAPSULES BY MOUTH EVERY 8 HOURS AS NEEDED FOR SEVERE HEADACHE WITH FOOD   pantoprazole (PROTONIX) 40 mg tablet   No No   Sig: TAKE 1 TABLET BY MOUTH EVERY DAY   topiramate (TOPAMAX) 50 MG tablet   No No   Sig: TAKE 1 AND 1/2 TABLETS (75MG) AT BEDTIME FOR 1 WEEK THEN TAKE 2 TABLETS (100MG) AT BEDTIME      Facility-Administered Medications: None       Past Medical History:   Diagnosis Date   • Asthma        Past Surgical History:   Procedure Laterality Date   •  SECTION     • FL LUMBAR PUNCTURE DIAGNOSTIC  2022       Family History   Problem Relation Age of Onset   • No Known Problems Other    • Arthritis Mother    • Fibromyalgia Mother    • Diabetes Family    • Cancer Family    • Heart disease Family      I have reviewed and agree with the history as documented. E-Cigarette/Vaping   • E-Cigarette Use Never User      E-Cigarette/Vaping Substances   • Nicotine No    • THC No    • CBD No    • Flavoring No    • Other No    • Unknown No      Social History     Tobacco Use   • Smoking status: Never   • Smokeless tobacco: Never   Vaping Use   • Vaping Use: Never used   Substance Use Topics   • Alcohol use: No   • Drug use: No        Review of Systems   Constitutional: Positive for fever. Negative for activity change, appetite change, chills and diaphoresis. Respiratory: Positive for cough, chest tightness and shortness of breath. Cardiovascular: Negative for chest pain. Gastrointestinal: Negative for abdominal distention, abdominal pain, constipation, diarrhea, nausea and vomiting. Musculoskeletal: Negative for arthralgias. Skin: Negative for color change and rash. Neurological: Negative for dizziness and light-headedness.        Physical Exam  ED Triage Vitals   Temperature Pulse Respirations Blood Pressure SpO2   10/09/23 1024 10/09/23 1024 10/09/23 1024 10/09/23 1026 10/09/23 1026   97.6 °F (36.4 °C) 78 17 (!) 157/107 98 %      Temp Source Heart Rate Source Patient Position - Orthostatic VS BP Location FiO2 (%)   10/09/23 1024 10/09/23 1024 10/09/23 1026 10/09/23 1026 --   Temporal Monitor Lying Right arm       Pain Score       --                    Orthostatic Vital Signs  Vitals:    10/09/23 1024 10/09/23 1026   BP:  (!) 157/107   Pulse: 78 Patient Position - Orthostatic VS:  Lying       Physical Exam  Constitutional:       General: She is not in acute distress. Appearance: She is obese. She is not ill-appearing. Comments: Patient resting comfortably not in respiratory distress with no increased work of breathing. Well-appearing. HENT:      Head: Normocephalic and atraumatic. Nose: Nose normal.      Mouth/Throat:      Mouth: Mucous membranes are moist.      Pharynx: Posterior oropharyngeal erythema present. No oropharyngeal exudate. Eyes:      Pupils: Pupils are equal, round, and reactive to light. Cardiovascular:      Rate and Rhythm: Normal rate. Heart sounds: Normal heart sounds. Pulmonary:      Effort: Pulmonary effort is normal. No respiratory distress. Breath sounds: Normal breath sounds. No wheezing or rales. Comments: Lungs CTA bilaterally. There is mild transmitted upper airway noise however no wheezing noted in lung fields  Abdominal:      Palpations: Abdomen is soft. Musculoskeletal:      Cervical back: Neck supple. Lymphadenopathy:      Cervical: Cervical adenopathy present. Skin:     General: Skin is warm and dry. Neurological:      General: No focal deficit present. Mental Status: She is alert and oriented to person, place, and time. ED Medications  Medications - No data to display    Diagnostic Studies  Results Reviewed     None                 XR chest 2 views   Final Result by Jen Teague MD (10/09 1244)      No acute cardiopulmonary disease. Findings are stable            Workstation performed: [id number]               Procedures  Procedures      ED Course                                       MDM     Patient is a 45 y.o. female  who presents to the ED with CC productive cough, sore throat, subjective fever. Vital signs stable, afebrile.  Exam as listed above    Differential diagnosis includes but is not limited to viral URI, pneumonia     Plan CXR to r/o pneumonia given productive cough and subjective fever although patient currently afebrile. No wheezing on auscultation to suggest asthma exacerbation. No rhales on exam. Will provide prednisone in light of Hx asthma with sob    View ED course above for further discussion on patient workup. All labs reviewed and utilized in the medical decision making process  All radiology studies independently viewed by me and interpreted by the radiologist.  I reviewed all testing with the patient. Upon re-evaluation patient resting comfortably not in respiratory distress. Will discharge home on five day prednisone burst.         Disposition  Final diagnoses:   Cough   URI (upper respiratory infection)     Time reflects when diagnosis was documented in both MDM as applicable and the Disposition within this note     Time User Action Codes Description Comment    10/9/2023 11:31 AM Bruna Level Add [R05.9] Cough     10/9/2023 11:31 AM Bruna Level Add [J06.9] URI (upper respiratory infection)       ED Disposition     ED Disposition   Discharge    Condition   Stable    Date/Time   Mon Oct 9, 2023 11:31 AM    Comment   Dennis Dang discharge to home/self care.                Follow-up Information    None         Discharge Medication List as of 10/9/2023 12:46 PM      START taking these medications    Details   predniSONE 20 mg tablet Take 3 tablets (60 mg total) by mouth daily for 5 days, Starting Mon 10/9/2023, Until Sat 10/14/2023, Normal         CONTINUE these medications which have NOT CHANGED    Details   acetaZOLAMIDE (DIAMOX) 250 mg tablet TAKE 2 TABLETS BY MOUTH EVERY MORNING, Normal      Advair HFA 45-21 MCG/ACT inhaler INHALE 2 PUFFS BY MOUTH 2 TIMES A DAY RINSE MOUTH AFTER USE., Normal      albuterol (2.5 mg/3 mL) 0.083 % nebulizer solution Take 3 mL (2.5 mg total) by nebulization every 6 (six) hours as needed for wheezing or shortness of breath, Starting Mon 1/9/2023, Normal      albuterol (PROVENTIL HFA,VENTOLIN HFA) 90 mcg/act inhaler INHALE 1 PUFF EVERY 4 HOURS AS NEEDED FOR WHEEZING OR SHORTNESS OF BREATH., Normal      ferrous sulfate 324 (65 Fe) mg Take 1 tablet (324 mg total) by mouth every other day, Starting Wed 9/29/2021, Normal      fluticasone-salmeterol (Advair) 500-50 mcg/dose inhaler Inhale 1 puff 2 (two) times a day, Historical Med      gabapentin (NEURONTIN) 100 mg capsule Take 1 capsule (100 mg total) by mouth 3 (three) times a day, Starting Mon 1/9/2023, Normal      indomethacin (INDOCIN) 25 mg capsule TAKE 1 TO 2 CAPSULES BY MOUTH EVERY 8 HOURS AS NEEDED FOR SEVERE HEADACHE WITH FOOD, Normal      pantoprazole (PROTONIX) 40 mg tablet TAKE 1 TABLET BY MOUTH EVERY DAY, Normal      topiramate (TOPAMAX) 50 MG tablet TAKE 1 AND 1/2 TABLETS (75MG) AT BEDTIME FOR 1 WEEK THEN TAKE 2 TABLETS (100MG) AT BEDTIME, Normal           No discharge procedures on file. PDMP Review     None           ED Provider  Attending physically available and evaluated Gini Howell. I managed the patient along with the ED Attending.     Electronically Signed by         Nichelle Sidhu MD  10/09/23 0759 Alert-The patient is alert, awake and responds to voice. The patient is oriented to time, place, and person. The triage nurse is able to obtain subjective information.

## 2025-05-01 NOTE — ED ADULT NURSE NOTE - OBJECTIVE STATEMENT
Pt presents to the ed c/o cough x2weeks associated with left chest discomfort radiating into her throat. Pt ax4, denies other complaints at this time. Tele/ maintained. Daughter @ bedside. Vitals as documented. Safety maintained.

## 2025-05-01 NOTE — ED PROVIDER NOTE - PATIENT PORTAL LINK FT
You can access the FollowMyHealth Patient Portal offered by [facility] by registering at the following website: [email or web address]. By joining [employer]’s FollowMyHealth portal, you will also be able to view your health information using other applications (apps) compatible with our system.

## 2025-05-01 NOTE — ED PROVIDER NOTE - CARE PROVIDER_API CALL
[place]  Cardiovascular Disease  [address], [address]  [place] [address]  Phone: [phone]  Fax: [phone]  Follow Up Time: 7-10 Days

## 2025-05-02 LAB — TROPONIN T, HIGH SENSITIVITY RESULT: 8 NG/L — SIGNIFICANT CHANGE UP (ref 0–51)

## 2025-05-02 PROCEDURE — 0225U NFCT DS DNA&RNA 21 SARSCOV2: CPT

## 2025-05-02 PROCEDURE — T1013: CPT

## 2025-05-02 PROCEDURE — 94640 AIRWAY INHALATION TREATMENT: CPT

## 2025-05-02 PROCEDURE — 83735 ASSAY OF MAGNESIUM: CPT

## 2025-05-02 PROCEDURE — 71046 X-RAY EXAM CHEST 2 VIEWS: CPT

## 2025-05-02 PROCEDURE — 80053 COMPREHEN METABOLIC PANEL: CPT

## 2025-05-02 PROCEDURE — 96374 THER/PROPH/DIAG INJ IV PUSH: CPT

## 2025-05-02 PROCEDURE — 85610 PROTHROMBIN TIME: CPT

## 2025-05-02 PROCEDURE — 83880 ASSAY OF NATRIURETIC PEPTIDE: CPT

## 2025-05-02 PROCEDURE — 85025 COMPLETE CBC W/AUTO DIFF WBC: CPT

## 2025-05-02 PROCEDURE — 84484 ASSAY OF TROPONIN QUANT: CPT

## 2025-05-02 PROCEDURE — 99285 EMERGENCY DEPT VISIT HI MDM: CPT | Mod: 25

## 2025-05-02 PROCEDURE — 85730 THROMBOPLASTIN TIME PARTIAL: CPT

## 2025-05-02 PROCEDURE — 85379 FIBRIN DEGRADATION QUANT: CPT

## 2025-05-02 PROCEDURE — 36415 COLL VENOUS BLD VENIPUNCTURE: CPT

## 2025-05-02 PROCEDURE — 83690 ASSAY OF LIPASE: CPT

## 2025-05-02 PROCEDURE — 93005 ELECTROCARDIOGRAM TRACING: CPT

## 2025-05-07 PROBLEM — E11.9 TYPE 2 DIABETES MELLITUS WITHOUT COMPLICATIONS: Chronic | Status: ACTIVE | Noted: 2025-05-01

## 2025-05-07 PROBLEM — E03.9 HYPOTHYROIDISM, UNSPECIFIED: Chronic | Status: ACTIVE | Noted: 2025-05-01

## 2025-05-07 PROBLEM — I10 ESSENTIAL (PRIMARY) HYPERTENSION: Chronic | Status: ACTIVE | Noted: 2025-05-01

## 2025-05-07 PROBLEM — E78.5 HYPERLIPIDEMIA, UNSPECIFIED: Chronic | Status: ACTIVE | Noted: 2025-05-01

## 2025-05-27 ENCOUNTER — APPOINTMENT (OUTPATIENT)
Dept: ORTHOPEDIC SURGERY | Facility: CLINIC | Age: 75
End: 2025-05-27
Payer: MEDICARE

## 2025-05-27 DIAGNOSIS — M25.642 STIFFNESS OF LEFT HAND, NOT ELSEWHERE CLASSIFIED: ICD-10-CM

## 2025-05-27 DIAGNOSIS — M65.332 TRIGGER FINGER, LEFT MIDDLE FINGER: ICD-10-CM

## 2025-05-27 DIAGNOSIS — M65.322 TRIGGER FINGER, LEFT INDEX FINGER: ICD-10-CM

## 2025-05-27 DIAGNOSIS — G56.02 CARPAL TUNNEL SYNDROME, LEFT UPPER LIMB: ICD-10-CM

## 2025-05-27 PROCEDURE — 20550 NJX 1 TENDON SHEATH/LIGAMENT: CPT | Mod: XS,LT

## 2025-05-27 PROCEDURE — 20526 THER INJECTION CARP TUNNEL: CPT | Mod: LT

## 2025-05-27 PROCEDURE — 99214 OFFICE O/P EST MOD 30 MIN: CPT | Mod: 25

## 2025-05-29 ENCOUNTER — APPOINTMENT (OUTPATIENT)
Dept: CARDIOLOGY | Facility: CLINIC | Age: 75
End: 2025-05-29
Payer: MEDICARE

## 2025-05-29 PROCEDURE — 93306 TTE W/DOPPLER COMPLETE: CPT

## 2025-07-16 ENCOUNTER — APPOINTMENT (OUTPATIENT)
Dept: PULMONOLOGY | Facility: CLINIC | Age: 75
End: 2025-07-16

## 2025-08-18 ENCOUNTER — APPOINTMENT (OUTPATIENT)
Dept: CT IMAGING | Facility: CLINIC | Age: 75
End: 2025-08-18

## 2025-09-18 ENCOUNTER — APPOINTMENT (OUTPATIENT)
Dept: CARDIOLOGY | Facility: CLINIC | Age: 75
End: 2025-09-18